# Patient Record
Sex: FEMALE | Race: WHITE | Employment: OTHER | URBAN - METROPOLITAN AREA
[De-identification: names, ages, dates, MRNs, and addresses within clinical notes are randomized per-mention and may not be internally consistent; named-entity substitution may affect disease eponyms.]

---

## 2017-04-07 ENCOUNTER — HOSPITAL ENCOUNTER (EMERGENCY)
Age: 62
Discharge: HOME | End: 2017-04-07
Attending: EMERGENCY MEDICINE
Payer: MEDICARE

## 2017-04-07 ENCOUNTER — APPOINTMENT (OUTPATIENT)
Dept: GENERAL RADIOLOGY | Age: 62
End: 2017-04-07
Payer: MEDICARE

## 2017-04-07 VITALS
WEIGHT: 135 LBS | HEIGHT: 61 IN | OXYGEN SATURATION: 99 % | HEART RATE: 71 BPM | TEMPERATURE: 97.3 F | RESPIRATION RATE: 18 BRPM | BODY MASS INDEX: 25.49 KG/M2 | DIASTOLIC BLOOD PRESSURE: 89 MMHG | SYSTOLIC BLOOD PRESSURE: 164 MMHG

## 2017-04-07 DIAGNOSIS — M17.10 ARTHRITIS OF KNEE: Primary | ICD-10-CM

## 2017-04-07 PROCEDURE — 73562 X-RAY EXAM OF KNEE 3: CPT

## 2017-04-07 PROCEDURE — 99283 EMERGENCY DEPT VISIT LOW MDM: CPT

## 2017-04-07 RX ORDER — QUETIAPINE FUMARATE 100 MG/1
100 TABLET, FILM COATED ORAL DAILY
COMMUNITY

## 2017-04-07 RX ORDER — IBUPROFEN 600 MG/1
600 TABLET ORAL EVERY 8 HOURS PRN
Qty: 30 TABLET | Refills: 0 | Status: SHIPPED | OUTPATIENT
Start: 2017-04-07

## 2017-04-07 RX ORDER — ASPIRIN 81 MG/1
81 TABLET, CHEWABLE ORAL DAILY
COMMUNITY

## 2017-04-07 ASSESSMENT — PAIN DESCRIPTION - DESCRIPTORS: DESCRIPTORS: SHOOTING

## 2017-04-07 ASSESSMENT — PAIN DESCRIPTION - LOCATION: LOCATION: KNEE

## 2017-04-07 ASSESSMENT — ENCOUNTER SYMPTOMS
GASTROINTESTINAL NEGATIVE: 1
RESPIRATORY NEGATIVE: 1
EYES NEGATIVE: 1
ALLERGIC/IMMUNOLOGIC NEGATIVE: 1

## 2017-04-07 ASSESSMENT — PAIN DESCRIPTION - ORIENTATION: ORIENTATION: LEFT

## 2017-04-07 ASSESSMENT — PAIN SCALES - GENERAL: PAINLEVEL_OUTOF10: 9

## 2017-12-19 ENCOUNTER — HOSPITAL ENCOUNTER (EMERGENCY)
Age: 62
Discharge: HOME | End: 2017-12-19
Attending: EMERGENCY MEDICINE
Payer: COMMERCIAL

## 2017-12-19 VITALS
SYSTOLIC BLOOD PRESSURE: 151 MMHG | OXYGEN SATURATION: 98 % | TEMPERATURE: 98.3 F | HEIGHT: 61 IN | HEART RATE: 79 BPM | BODY MASS INDEX: 25.49 KG/M2 | WEIGHT: 135 LBS | RESPIRATION RATE: 16 BRPM | DIASTOLIC BLOOD PRESSURE: 100 MMHG

## 2017-12-19 DIAGNOSIS — H66.90 ACUTE OTITIS MEDIA, UNSPECIFIED OTITIS MEDIA TYPE: ICD-10-CM

## 2017-12-19 DIAGNOSIS — H92.02 OTALGIA OF LEFT EAR: Primary | ICD-10-CM

## 2017-12-19 LAB
DIRECT EXAM: NORMAL
Lab: NORMAL
SPECIMEN DESCRIPTION: NORMAL
SPECIMEN DESCRIPTION: NORMAL
STATUS: NORMAL

## 2017-12-19 PROCEDURE — 87804 INFLUENZA ASSAY W/OPTIC: CPT

## 2017-12-19 PROCEDURE — 96374 THER/PROPH/DIAG INJ IV PUSH: CPT

## 2017-12-19 PROCEDURE — 99283 EMERGENCY DEPT VISIT LOW MDM: CPT

## 2017-12-19 PROCEDURE — 6360000002 HC RX W HCPCS: Performed by: EMERGENCY MEDICINE

## 2017-12-19 PROCEDURE — 6370000000 HC RX 637 (ALT 250 FOR IP): Performed by: EMERGENCY MEDICINE

## 2017-12-19 RX ORDER — AMOXICILLIN AND CLAVULANATE POTASSIUM 875; 125 MG/1; MG/1
1 TABLET, FILM COATED ORAL ONCE
Status: COMPLETED | OUTPATIENT
Start: 2017-12-19 | End: 2017-12-19

## 2017-12-19 RX ORDER — AMOXICILLIN AND CLAVULANATE POTASSIUM 875; 125 MG/1; MG/1
1 TABLET, FILM COATED ORAL 2 TIMES DAILY
Qty: 20 TABLET | Refills: 0 | Status: SHIPPED | OUTPATIENT
Start: 2017-12-19 | End: 2017-12-29

## 2017-12-19 RX ORDER — KETOROLAC TROMETHAMINE 30 MG/ML
30 INJECTION, SOLUTION INTRAMUSCULAR; INTRAVENOUS ONCE
Status: COMPLETED | OUTPATIENT
Start: 2017-12-19 | End: 2017-12-19

## 2017-12-19 RX ADMIN — AMOXICILLIN AND CLAVULANATE POTASSIUM 1 TABLET: 875; 125 TABLET, FILM COATED ORAL at 17:34

## 2017-12-19 RX ADMIN — KETOROLAC TROMETHAMINE 30 MG: 30 INJECTION, SOLUTION INTRAMUSCULAR at 17:34

## 2017-12-19 ASSESSMENT — ENCOUNTER SYMPTOMS
SORE THROAT: 0
EYES NEGATIVE: 1
VOMITING: 0
BACK PAIN: 0
SHORTNESS OF BREATH: 0
RESPIRATORY NEGATIVE: 1
ABDOMINAL PAIN: 0
GASTROINTESTINAL NEGATIVE: 1
COUGH: 0

## 2017-12-19 ASSESSMENT — PAIN SCALES - GENERAL
PAINLEVEL_OUTOF10: 6
PAINLEVEL_OUTOF10: 6

## 2017-12-19 NOTE — ED NOTES
Flu swab labeled and sent to lab. Pt tolerated procedure well.       Smith Francisco RN  12/19/17 3085

## 2017-12-19 NOTE — ED PROVIDER NOTES
[address] ED  eMERGENCY dEPARTMENT eNCOUnter    Pt Name: Melissa Vargas  MRN: [id number]  [place] 1955  Date of evaluation: 12/19/17  CHIEF COMPLAINT       Chief Complaint   Patient presents with    Influenza    Otalgia     HISTORY OF PRESENT ILLNESS   The pt presents with fever and ear pain. She presented via ems. Symptoms started last night. She has sick contacts at home with uri symptoms. No rashes. No neck pain. No cough or sore throat. No speech changes or difficulty swallowing. She is a smoker. The history is provided by the patient. Ear Problem   Location:  Left  Quality:  Sharp  Severity:  Moderate  Onset quality:  Gradual  Duration:  1 day  Timing:  Constant  Progression:  Worsening  Chronicity:  New  Relieved by:  Nothing  Worsened by:  Nothing  Ineffective treatments:  None tried  Associated symptoms: congestion and fever    Associated symptoms: no abdominal pain, no cough, no ear discharge, no headaches, no neck pain, no rash, no sore throat and no vomiting        REVIEW OF SYSTEMS     Review of Systems   Constitutional: Positive for fever. HENT: Positive for congestion. Negative for ear discharge and sore throat. Eyes: Negative. Respiratory: Negative. Negative for cough and shortness of breath. Cardiovascular: Negative. Negative for chest pain. Gastrointestinal: Negative. Negative for abdominal pain and vomiting. Genitourinary: Negative. Musculoskeletal: Negative. Negative for back pain and neck pain. Skin: Negative. Negative for rash. Neurological: Negative. Negative for headaches. All other systems reviewed and are negative.     PAST MEDICAL HISTORY     Past Medical History:   Diagnosis Date    Cancer [facility])     uterine    Carpal tunnel syndrome     bilat, numbness bilat fingers    Diabetes ([facility] [address]     borderline    Fall 8/2015    states tripped while trimming bush, bruising cheat and abdomen    Full dentures     History of ear infections also right ear \" hisses \"    Hx of blood clots     both legs    Hypertension     Pneumonia     x4    Poor historian     Sciatica      SURGICAL HISTORY       Past Surgical History:   Procedure Laterality Date    ANKLE SURGERY      Lt ankle    CHOLECYSTECTOMY      HYSTERECTOMY      total    KNEE SURGERY Left      cyst, torn ligaments    NASAL ENDOSCOPY  2015    with biopsy    NOSE SURGERY      cut bone off    OTHER SURGICAL HISTORY      jaw surgery     TONSILLECTOMY AND ADENOIDECTOMY      TUBAL LIGATION       CURRENT MEDICATIONS       Discharge Medication List as of 2017  5:31 PM      CONTINUE these medications which have NOT CHANGED    Details   QUEtiapine (SEROQUEL) 100 MG tablet Take 100 mg by mouth dailyHistorical Med      aspirin 81 MG chewable tablet Take 81 mg by mouth dailyHistorical Med      ibuprofen (ADVIL;MOTRIN) 600 MG tablet Take 1 tablet by mouth every 8 hours as needed for Pain, Disp-30 tablet, R-0Print      vitamin D (CHOLECALCIFEROL) 1000 UNIT TABS tablet Take 1,000 Units by mouth daily      cloNIDine (CATAPRES) 0.2 MG tablet take 1 tablet by mouth twice a day, Disp-60 tablet, R-3      gabapentin (NEURONTIN) 600 MG tablet take 1 tablet by mouth three times a day., Disp-90 tablet, R-3           ALLERGIES     is allergic to tape [adhesive tape] and tylenol [acetaminophen]. FAMILY HISTORY     indicated that her mother is alive. She indicated that her father is . SOCIAL HISTORY      reports that she has been smoking. She has a 15.00 pack-year smoking history. She has never used smokeless tobacco. She reports that she does not drink alcohol or use drugs. PHYSICAL EXAM     INITIAL VITALS: BP (!) 151/100   Pulse 79   Temp 98.3 °F (36.8 °C) (Oral)   Resp 16   Ht 5' 1\" (1.549 m)   Wt 135 lb (61.2 kg)   SpO2 98%   BMI 25.51 kg/m²    Physical Exam   Constitutional: She is oriented to person, place, and time. No distress.    HENT:   Head: Normocephalic and 79   Resp:  16   Temp:  98.3 °F (36.8 °C)   TempSrc:  Oral   SpO2: 98% 98%   Weight:  135 lb (61.2 kg)   Height:  5' 1\" (1.549 m)     The patient was given the following medications while in the emergency department:  Orders Placed This Encounter   Medications    ketorolac (TORADOL) injection 30 mg    amoxicillin-clavulanate (AUGMENTIN) 875-125 MG per tablet 1 tablet    amoxicillin-clavulanate (AUGMENTIN) 875-125 MG per tablet     Sig: Take 1 tablet by mouth 2 times daily for 10 days     Dispense:  20 tablet     Refill:  0     CONSULTS:  None    FINAL IMPRESSION      1. Otalgia of left ear    2.  Acute otitis media, unspecified otitis media type          DISPOSITION/PLAN   DISPOSITION Decision To Discharge 12/19/2017 05:30:20 PM      PATIENT REFERRED TO:  Clara Velarde [address] [place] [address] [address]  [phone]    Call in 1 day      DISCHARGE MEDICATIONS:  Discharge Medication List as of 12/19/2017  5:31 PM      START taking these medications    Details   amoxicillin-clavulanate (AUGMENTIN) 875-125 MG per tablet Take 1 tablet by mouth 2 times daily for 10 days, Disp-20 tablet, R-0Print           Ramu Barros MD  Attending Emergency Physician                      Elaine Maldonado MD  12/19/17 0648

## 2017-12-19 NOTE — ED NOTES
Bed: 04  Expected date:   Expected time:   Means of arrival:   Comments:     Omar Swain RN  12/19/17 7484

## 2018-05-31 ENCOUNTER — HOSPITAL ENCOUNTER (INPATIENT)
Age: 63
LOS: 1 days | Discharge: LEFT BEFORE DISCHARGE | DRG: 917 | End: 2018-06-02
Attending: EMERGENCY MEDICINE | Admitting: INTERNAL MEDICINE
Payer: COMMERCIAL

## 2018-05-31 DIAGNOSIS — F14.10 COCAINE ABUSE (HCC): ICD-10-CM

## 2018-05-31 DIAGNOSIS — R79.89 ELEVATED SERUM CREATININE: ICD-10-CM

## 2018-05-31 DIAGNOSIS — R09.02 HYPOXIA: ICD-10-CM

## 2018-05-31 DIAGNOSIS — N17.9 AKI (ACUTE KIDNEY INJURY) (HCC): ICD-10-CM

## 2018-05-31 DIAGNOSIS — I21.4 NSTEMI (NON-ST ELEVATED MYOCARDIAL INFARCTION) (HCC): Primary | ICD-10-CM

## 2018-05-31 DIAGNOSIS — T40.604A OPIATE OVERDOSE, UNDETERMINED INTENT, INITIAL ENCOUNTER (HCC): ICD-10-CM

## 2018-05-31 DIAGNOSIS — R79.9 ELEVATED BUN: ICD-10-CM

## 2018-05-31 LAB
AMPHETAMINE SCREEN URINE: NEGATIVE
BARBITURATE SCREEN URINE: NEGATIVE
BENZODIAZEPINE SCREEN, URINE: NEGATIVE
BUPRENORPHINE URINE: ABNORMAL
CANNABINOID SCREEN URINE: NEGATIVE
COCAINE METABOLITE, URINE: POSITIVE
MDMA URINE: ABNORMAL
METHADONE SCREEN, URINE: NEGATIVE
METHAMPHETAMINE, URINE: ABNORMAL
OPIATES, URINE: POSITIVE
OXYCODONE SCREEN URINE: NEGATIVE
PHENCYCLIDINE, URINE: NEGATIVE
PROPOXYPHENE, URINE: ABNORMAL
TEST INFORMATION: ABNORMAL
TRICYCLIC ANTIDEPRESSANTS, UR: ABNORMAL

## 2018-05-31 PROCEDURE — 6360000002 HC RX W HCPCS: Performed by: EMERGENCY MEDICINE

## 2018-05-31 PROCEDURE — 2580000003 HC RX 258: Performed by: EMERGENCY MEDICINE

## 2018-05-31 PROCEDURE — 99285 EMERGENCY DEPT VISIT HI MDM: CPT

## 2018-05-31 PROCEDURE — 80053 COMPREHEN METABOLIC PANEL: CPT

## 2018-05-31 PROCEDURE — 85025 COMPLETE CBC W/AUTO DIFF WBC: CPT

## 2018-05-31 PROCEDURE — 96374 THER/PROPH/DIAG INJ IV PUSH: CPT

## 2018-05-31 PROCEDURE — 84484 ASSAY OF TROPONIN QUANT: CPT

## 2018-05-31 PROCEDURE — 80307 DRUG TEST PRSMV CHEM ANLYZR: CPT

## 2018-05-31 PROCEDURE — 96376 TX/PRO/DX INJ SAME DRUG ADON: CPT

## 2018-05-31 PROCEDURE — 93005 ELECTROCARDIOGRAM TRACING: CPT

## 2018-05-31 PROCEDURE — 6360000002 HC RX W HCPCS

## 2018-05-31 RX ORDER — NALOXONE HYDROCHLORIDE 1 MG/ML
INJECTION INTRAMUSCULAR; INTRAVENOUS; SUBCUTANEOUS
Status: COMPLETED
Start: 2018-05-31 | End: 2018-05-31

## 2018-05-31 RX ORDER — NALOXONE HYDROCHLORIDE 1 MG/ML
2 INJECTION INTRAMUSCULAR; INTRAVENOUS; SUBCUTANEOUS ONCE
Status: COMPLETED | OUTPATIENT
Start: 2018-05-31 | End: 2018-05-31

## 2018-05-31 RX ADMIN — NALOXONE HYDROCHLORIDE 1 MG/HR: 1 INJECTION PARENTERAL at 22:28

## 2018-05-31 RX ADMIN — NALOXONE HYDROCHLORIDE 2 MG: 1 INJECTION PARENTERAL at 21:45

## 2018-05-31 RX ADMIN — NALOXONE HYDROCHLORIDE 2 MG: 1 INJECTION INTRAMUSCULAR; INTRAVENOUS; SUBCUTANEOUS at 21:45

## 2018-05-31 ASSESSMENT — PAIN SCALES - PAIN ASSESSMENT IN ADVANCED DEMENTIA (PAINAD)
BREATHING: 0
BODYLANGUAGE: 0
TOTALSCORE: 0
CONSOLABILITY: 0
NEGVOCALIZATION: 0
FACIALEXPRESSION: 0

## 2018-06-01 ENCOUNTER — APPOINTMENT (OUTPATIENT)
Dept: GENERAL RADIOLOGY | Age: 63
DRG: 917 | End: 2018-06-01
Payer: COMMERCIAL

## 2018-06-01 ENCOUNTER — APPOINTMENT (OUTPATIENT)
Dept: ULTRASOUND IMAGING | Age: 63
DRG: 917 | End: 2018-06-01
Payer: COMMERCIAL

## 2018-06-01 PROBLEM — T50.901A DRUG OVERDOSE, ACCIDENTAL OR UNINTENTIONAL, INITIAL ENCOUNTER: Status: ACTIVE | Noted: 2018-06-01

## 2018-06-01 PROBLEM — N17.9 AKI (ACUTE KIDNEY INJURY) (HCC): Status: ACTIVE | Noted: 2018-06-01

## 2018-06-01 LAB
-: ABNORMAL
ABSOLUTE BANDS #: 1.18 K/UL (ref 0–1)
ABSOLUTE EOS #: 0 K/UL (ref 0–0.4)
ABSOLUTE IMMATURE GRANULOCYTE: ABNORMAL K/UL (ref 0–0.3)
ABSOLUTE LYMPH #: 1.32 K/UL (ref 1–4.8)
ABSOLUTE MONO #: 1.03 K/UL (ref 0.1–1.3)
ALBUMIN SERPL-MCNC: 3.6 G/DL (ref 3.5–5.2)
ALBUMIN SERPL-MCNC: 3.6 G/DL (ref 3.5–5.2)
ALBUMIN/GLOBULIN RATIO: ABNORMAL (ref 1–2.5)
ALBUMIN/GLOBULIN RATIO: ABNORMAL (ref 1–2.5)
ALP BLD-CCNC: 30 U/L (ref 35–104)
ALP BLD-CCNC: 32 U/L (ref 35–104)
ALT SERPL-CCNC: 13 U/L (ref 5–33)
ALT SERPL-CCNC: 13 U/L (ref 5–33)
AMORPHOUS: ABNORMAL
ANION GAP SERPL CALCULATED.3IONS-SCNC: 13 MMOL/L (ref 9–17)
ANION GAP SERPL CALCULATED.3IONS-SCNC: 14 MMOL/L (ref 9–17)
AST SERPL-CCNC: 24 U/L
AST SERPL-CCNC: 26 U/L
BACTERIA: ABNORMAL
BANDS: 8 % (ref 0–10)
BASOPHILS # BLD: 0 % (ref 0–2)
BASOPHILS ABSOLUTE: 0 K/UL (ref 0–0.2)
BILIRUB SERPL-MCNC: 0.35 MG/DL (ref 0.3–1.2)
BILIRUB SERPL-MCNC: 0.47 MG/DL (ref 0.3–1.2)
BILIRUBIN URINE: NEGATIVE
BUN BLDV-MCNC: 22 MG/DL (ref 8–23)
BUN BLDV-MCNC: 24 MG/DL (ref 8–23)
BUN/CREAT BLD: ABNORMAL (ref 9–20)
BUN/CREAT BLD: ABNORMAL (ref 9–20)
CALCIUM IONIZED: 1.19 MMOL/L (ref 1.13–1.33)
CALCIUM SERPL-MCNC: 7.4 MG/DL (ref 8.6–10.4)
CALCIUM SERPL-MCNC: 8.2 MG/DL (ref 8.6–10.4)
CASTS UA: ABNORMAL /LPF
CHLORIDE BLD-SCNC: 105 MMOL/L (ref 98–107)
CHLORIDE BLD-SCNC: 105 MMOL/L (ref 98–107)
CO2: 20 MMOL/L (ref 20–31)
CO2: 21 MMOL/L (ref 20–31)
COLOR: YELLOW
COMMENT UA: ABNORMAL
CREAT SERPL-MCNC: 1.73 MG/DL (ref 0.5–0.9)
CREAT SERPL-MCNC: 2.4 MG/DL (ref 0.5–0.9)
CREATININE URINE: 99.2 MG/DL (ref 28–217)
CRYSTALS, UA: ABNORMAL /HPF
DIFFERENTIAL TYPE: ABNORMAL
DIRECT EXAM: NORMAL
EKG ATRIAL RATE: 87 BPM
EKG ATRIAL RATE: 88 BPM
EKG P AXIS: 69 DEGREES
EKG P AXIS: 70 DEGREES
EKG P-R INTERVAL: 158 MS
EKG P-R INTERVAL: 162 MS
EKG Q-T INTERVAL: 392 MS
EKG Q-T INTERVAL: 398 MS
EKG QRS DURATION: 76 MS
EKG QRS DURATION: 82 MS
EKG QTC CALCULATION (BAZETT): 471 MS
EKG QTC CALCULATION (BAZETT): 481 MS
EKG R AXIS: 49 DEGREES
EKG R AXIS: 52 DEGREES
EKG T AXIS: -46 DEGREES
EKG T AXIS: 12 DEGREES
EKG VENTRICULAR RATE: 87 BPM
EKG VENTRICULAR RATE: 88 BPM
EOSINOPHILS RELATIVE PERCENT: 0 % (ref 0–4)
EPITHELIAL CELLS UA: ABNORMAL /HPF
GFR AFRICAN AMERICAN: 25 ML/MIN
GFR AFRICAN AMERICAN: 36 ML/MIN
GFR NON-AFRICAN AMERICAN: 20 ML/MIN
GFR NON-AFRICAN AMERICAN: 30 ML/MIN
GFR SERPL CREATININE-BSD FRML MDRD: ABNORMAL ML/MIN/{1.73_M2}
GLUCOSE BLD-MCNC: 109 MG/DL (ref 70–99)
GLUCOSE BLD-MCNC: 121 MG/DL (ref 70–99)
GLUCOSE URINE: NEGATIVE
HCT VFR BLD CALC: 40.8 % (ref 36–46)
HCT VFR BLD CALC: 42.2 % (ref 36–46)
HEMOGLOBIN: 13.7 G/DL (ref 12–16)
HEMOGLOBIN: 14.1 G/DL (ref 12–16)
IMMATURE GRANULOCYTES: ABNORMAL %
INR BLD: 1.1
KETONES, URINE: NEGATIVE
LEUKOCYTE ESTERASE, URINE: NEGATIVE
LV EF: 75 %
LVEF MODALITY: NORMAL
LYMPHOCYTES # BLD: 9 % (ref 24–44)
Lab: NORMAL
Lab: NORMAL
MAGNESIUM: 1.7 MG/DL (ref 1.6–2.6)
MCH RBC QN AUTO: 27.6 PG (ref 26–34)
MCH RBC QN AUTO: 27.9 PG (ref 26–34)
MCHC RBC AUTO-ENTMCNC: 33.3 G/DL (ref 31–37)
MCHC RBC AUTO-ENTMCNC: 33.5 G/DL (ref 31–37)
MCV RBC AUTO: 82.8 FL (ref 80–100)
MCV RBC AUTO: 83.1 FL (ref 80–100)
MONOCYTES # BLD: 7 % (ref 1–7)
MORPHOLOGY: ABNORMAL
MUCUS: ABNORMAL
NITRITE, URINE: NEGATIVE
NRBC AUTOMATED: ABNORMAL PER 100 WBC
NRBC AUTOMATED: ABNORMAL PER 100 WBC
OTHER OBSERVATIONS UA: ABNORMAL
PARTIAL THROMBOPLASTIN TIME: 23.6 SEC (ref 23–31)
PDW BLD-RTO: 15.6 % (ref 11.5–14.9)
PDW BLD-RTO: 15.7 % (ref 11.5–14.9)
PH UA: 5.5 (ref 5–8)
PLATELET # BLD: 129 K/UL (ref 150–450)
PLATELET # BLD: 145 K/UL (ref 150–450)
PLATELET ESTIMATE: ABNORMAL
PMV BLD AUTO: 7.9 FL (ref 6–12)
PMV BLD AUTO: 8 FL (ref 6–12)
POTASSIUM SERPL-SCNC: 4 MMOL/L (ref 3.7–5.3)
POTASSIUM SERPL-SCNC: 4 MMOL/L (ref 3.7–5.3)
PROTEIN UA: ABNORMAL
PROTHROMBIN TIME: 11.8 SEC (ref 9.7–12)
RBC # BLD: 4.91 M/UL (ref 4–5.2)
RBC # BLD: 5.1 M/UL (ref 4–5.2)
RBC # BLD: ABNORMAL 10*6/UL
RBC UA: ABNORMAL /HPF
RENAL EPITHELIAL, UA: ABNORMAL /HPF
SEG NEUTROPHILS: 76 % (ref 36–66)
SEGMENTED NEUTROPHILS ABSOLUTE COUNT: 11.17 K/UL (ref 1.3–9.1)
SODIUM BLD-SCNC: 138 MMOL/L (ref 135–144)
SODIUM BLD-SCNC: 140 MMOL/L (ref 135–144)
SODIUM,UR: 68 MMOL/L
SPECIFIC GRAVITY UA: 1.02 (ref 1–1.03)
SPECIMEN DESCRIPTION: NORMAL
STATUS: NORMAL
STATUS: NORMAL
TOTAL CK: 100 U/L (ref 26–192)
TOTAL PROTEIN: 7.3 G/DL (ref 6.4–8.3)
TOTAL PROTEIN: 7.4 G/DL (ref 6.4–8.3)
TRICHOMONAS: ABNORMAL
TROPONIN INTERP: ABNORMAL
TROPONIN T: 0.1 NG/ML
TROPONIN T: 0.11 NG/ML
TROPONIN T: 0.11 NG/ML
TROPONIN T: 0.13 NG/ML
TURBIDITY: ABNORMAL
URINE HGB: NEGATIVE
UROBILINOGEN, URINE: NORMAL
WBC # BLD: 14.6 K/UL (ref 3.5–11)
WBC # BLD: 14.7 K/UL (ref 3.5–11)
WBC # BLD: ABNORMAL 10*3/UL
WBC UA: ABNORMAL /HPF
YEAST: ABNORMAL

## 2018-06-01 PROCEDURE — 6370000000 HC RX 637 (ALT 250 FOR IP): Performed by: INTERNAL MEDICINE

## 2018-06-01 PROCEDURE — 2580000003 HC RX 258: Performed by: INTERNAL MEDICINE

## 2018-06-01 PROCEDURE — 87186 SC STD MICRODIL/AGAR DIL: CPT

## 2018-06-01 PROCEDURE — 76770 US EXAM ABDO BACK WALL COMP: CPT

## 2018-06-01 PROCEDURE — 82570 ASSAY OF URINE CREATININE: CPT

## 2018-06-01 PROCEDURE — 87899 AGENT NOS ASSAY W/OPTIC: CPT

## 2018-06-01 PROCEDURE — 85027 COMPLETE CBC AUTOMATED: CPT

## 2018-06-01 PROCEDURE — 6360000002 HC RX W HCPCS: Performed by: NURSE PRACTITIONER

## 2018-06-01 PROCEDURE — 82550 ASSAY OF CK (CPK): CPT

## 2018-06-01 PROCEDURE — 2060000000 HC ICU INTERMEDIATE R&B

## 2018-06-01 PROCEDURE — 6370000000 HC RX 637 (ALT 250 FOR IP): Performed by: STUDENT IN AN ORGANIZED HEALTH CARE EDUCATION/TRAINING PROGRAM

## 2018-06-01 PROCEDURE — 85730 THROMBOPLASTIN TIME PARTIAL: CPT

## 2018-06-01 PROCEDURE — 2580000003 HC RX 258: Performed by: NURSE PRACTITIONER

## 2018-06-01 PROCEDURE — 80053 COMPREHEN METABOLIC PANEL: CPT

## 2018-06-01 PROCEDURE — 51702 INSERT TEMP BLADDER CATH: CPT

## 2018-06-01 PROCEDURE — 51798 US URINE CAPACITY MEASURE: CPT

## 2018-06-01 PROCEDURE — 93005 ELECTROCARDIOGRAM TRACING: CPT

## 2018-06-01 PROCEDURE — 84300 ASSAY OF URINE SODIUM: CPT

## 2018-06-01 PROCEDURE — 71045 X-RAY EXAM CHEST 1 VIEW: CPT

## 2018-06-01 PROCEDURE — 82330 ASSAY OF CALCIUM: CPT

## 2018-06-01 PROCEDURE — 99223 1ST HOSP IP/OBS HIGH 75: CPT | Performed by: INTERNAL MEDICINE

## 2018-06-01 PROCEDURE — 81001 URINALYSIS AUTO W/SCOPE: CPT

## 2018-06-01 PROCEDURE — 83735 ASSAY OF MAGNESIUM: CPT

## 2018-06-01 PROCEDURE — 36415 COLL VENOUS BLD VENIPUNCTURE: CPT

## 2018-06-01 PROCEDURE — 93306 TTE W/DOPPLER COMPLETE: CPT

## 2018-06-01 PROCEDURE — 6370000000 HC RX 637 (ALT 250 FOR IP): Performed by: EMERGENCY MEDICINE

## 2018-06-01 PROCEDURE — 94762 N-INVAS EAR/PLS OXIMTRY CONT: CPT

## 2018-06-01 PROCEDURE — 84484 ASSAY OF TROPONIN QUANT: CPT

## 2018-06-01 PROCEDURE — 6360000002 HC RX W HCPCS: Performed by: INTERNAL MEDICINE

## 2018-06-01 PROCEDURE — 85610 PROTHROMBIN TIME: CPT

## 2018-06-01 PROCEDURE — 87086 URINE CULTURE/COLONY COUNT: CPT

## 2018-06-01 PROCEDURE — 87449 NOS EACH ORGANISM AG IA: CPT

## 2018-06-01 PROCEDURE — 87077 CULTURE AEROBIC IDENTIFY: CPT

## 2018-06-01 PROCEDURE — 86738 MYCOPLASMA ANTIBODY: CPT

## 2018-06-01 RX ORDER — QUETIAPINE FUMARATE 100 MG/1
100 TABLET, FILM COATED ORAL DAILY
Status: DISCONTINUED | OUTPATIENT
Start: 2018-06-01 | End: 2018-06-02 | Stop reason: HOSPADM

## 2018-06-01 RX ORDER — SODIUM CHLORIDE 9 MG/ML
INJECTION, SOLUTION INTRAVENOUS CONTINUOUS
Status: DISCONTINUED | OUTPATIENT
Start: 2018-06-01 | End: 2018-06-02 | Stop reason: HOSPADM

## 2018-06-01 RX ORDER — MECLIZINE HYDROCHLORIDE 25 MG/1
25 TABLET ORAL 2 TIMES DAILY PRN
COMMUNITY

## 2018-06-01 RX ORDER — CETIRIZINE HYDROCHLORIDE 10 MG/1
10 TABLET ORAL DAILY
COMMUNITY

## 2018-06-01 RX ORDER — LISINOPRIL 20 MG/1
20 TABLET ORAL DAILY
COMMUNITY

## 2018-06-01 RX ORDER — NAPROXEN 500 MG/1
500 TABLET ORAL 2 TIMES DAILY WITH MEALS
COMMUNITY

## 2018-06-01 RX ORDER — SODIUM CHLORIDE 9 MG/ML
INJECTION, SOLUTION INTRAVENOUS CONTINUOUS
Status: DISCONTINUED | OUTPATIENT
Start: 2018-06-01 | End: 2018-06-01

## 2018-06-01 RX ORDER — M-VIT,TX,IRON,MINS/CALC/FOLIC 27MG-0.4MG
1 TABLET ORAL DAILY
COMMUNITY

## 2018-06-01 RX ORDER — NICOTINE 21 MG/24HR
1 PATCH, TRANSDERMAL 24 HOURS TRANSDERMAL DAILY PRN
Status: DISCONTINUED | OUTPATIENT
Start: 2018-06-01 | End: 2018-06-02 | Stop reason: HOSPADM

## 2018-06-01 RX ORDER — AMLODIPINE BESYLATE 5 MG/1
5 TABLET ORAL DAILY
Status: DISCONTINUED | OUTPATIENT
Start: 2018-06-02 | End: 2018-06-02 | Stop reason: HOSPADM

## 2018-06-01 RX ORDER — ONDANSETRON 2 MG/ML
4 INJECTION INTRAMUSCULAR; INTRAVENOUS EVERY 6 HOURS PRN
Status: DISCONTINUED | OUTPATIENT
Start: 2018-06-01 | End: 2018-06-02 | Stop reason: HOSPADM

## 2018-06-01 RX ORDER — ASPIRIN 300 MG/1
300 SUPPOSITORY RECTAL EVERY 6 HOURS PRN
Status: DISCONTINUED | OUTPATIENT
Start: 2018-06-01 | End: 2018-06-02 | Stop reason: HOSPADM

## 2018-06-01 RX ORDER — SODIUM CHLORIDE 0.9 % (FLUSH) 0.9 %
10 SYRINGE (ML) INJECTION PRN
Status: DISCONTINUED | OUTPATIENT
Start: 2018-06-01 | End: 2018-06-02 | Stop reason: HOSPADM

## 2018-06-01 RX ORDER — GABAPENTIN 600 MG/1
600 TABLET ORAL 2 TIMES DAILY
Status: DISCONTINUED | OUTPATIENT
Start: 2018-06-01 | End: 2018-06-02 | Stop reason: HOSPADM

## 2018-06-01 RX ORDER — ASPIRIN 81 MG/1
81 TABLET ORAL DAILY
Status: DISCONTINUED | OUTPATIENT
Start: 2018-06-01 | End: 2018-06-02 | Stop reason: HOSPADM

## 2018-06-01 RX ORDER — CLONIDINE HYDROCHLORIDE 0.2 MG/1
0.2 TABLET ORAL 2 TIMES DAILY
Status: DISCONTINUED | OUTPATIENT
Start: 2018-06-01 | End: 2018-06-02 | Stop reason: HOSPADM

## 2018-06-01 RX ORDER — SODIUM CHLORIDE 0.9 % (FLUSH) 0.9 %
10 SYRINGE (ML) INJECTION EVERY 12 HOURS SCHEDULED
Status: DISCONTINUED | OUTPATIENT
Start: 2018-06-01 | End: 2018-06-02 | Stop reason: HOSPADM

## 2018-06-01 RX ADMIN — NALOXONE HYDROCHLORIDE 1 MG/HR: 1 INJECTION PARENTERAL at 03:45

## 2018-06-01 RX ADMIN — PIPERACILLIN SODIUM AND TAZOBACTAM SODIUM 3.38 G: 3; .375 INJECTION, POWDER, LYOPHILIZED, FOR SOLUTION INTRAVENOUS at 12:46

## 2018-06-01 RX ADMIN — NALOXONE HYDROCHLORIDE 1 MG/HR: 1 INJECTION PARENTERAL at 05:58

## 2018-06-01 RX ADMIN — ASPIRIN 300 MG: 300 SUPPOSITORY RECTAL at 00:54

## 2018-06-01 RX ADMIN — CLONIDINE HYDROCHLORIDE 0.2 MG: 0.2 TABLET ORAL at 12:46

## 2018-06-01 RX ADMIN — CLONIDINE HYDROCHLORIDE 0.2 MG: 0.2 TABLET ORAL at 20:14

## 2018-06-01 RX ADMIN — QUETIAPINE FUMARATE 100 MG: 100 TABLET ORAL at 20:14

## 2018-06-01 RX ADMIN — NALOXONE HYDROCHLORIDE 1 MG/HR: 1 INJECTION PARENTERAL at 01:20

## 2018-06-01 RX ADMIN — ASPIRIN 81 MG: 81 TABLET, COATED ORAL at 17:24

## 2018-06-01 RX ADMIN — NALOXONE HYDROCHLORIDE 1 MG/HR: 1 INJECTION PARENTERAL at 08:15

## 2018-06-01 RX ADMIN — PIPERACILLIN SODIUM AND TAZOBACTAM SODIUM 3.38 G: 3; .375 INJECTION, POWDER, LYOPHILIZED, FOR SOLUTION INTRAVENOUS at 06:48

## 2018-06-01 RX ADMIN — PIPERACILLIN SODIUM AND TAZOBACTAM SODIUM 3.38 G: 3; .375 INJECTION, POWDER, LYOPHILIZED, FOR SOLUTION INTRAVENOUS at 18:19

## 2018-06-01 RX ADMIN — SODIUM CHLORIDE: 9 INJECTION, SOLUTION INTRAVENOUS at 12:47

## 2018-06-01 RX ADMIN — GABAPENTIN 600 MG: 600 TABLET, FILM COATED ORAL at 20:14

## 2018-06-01 ASSESSMENT — PAIN SCALES - GENERAL
PAINLEVEL_OUTOF10: 0

## 2018-06-02 VITALS
DIASTOLIC BLOOD PRESSURE: 52 MMHG | OXYGEN SATURATION: 98 % | WEIGHT: 119.05 LBS | SYSTOLIC BLOOD PRESSURE: 112 MMHG | BODY MASS INDEX: 19.13 KG/M2 | HEIGHT: 66 IN | RESPIRATION RATE: 18 BRPM | HEART RATE: 64 BPM | TEMPERATURE: 97.3 F

## 2018-06-02 LAB
ALBUMIN SERPL-MCNC: 2.7 G/DL (ref 3.5–5.2)
ALBUMIN/GLOBULIN RATIO: ABNORMAL (ref 1–2.5)
ALP BLD-CCNC: 26 U/L (ref 35–104)
ALT SERPL-CCNC: 9 U/L (ref 5–33)
ANION GAP SERPL CALCULATED.3IONS-SCNC: 9 MMOL/L (ref 9–17)
AST SERPL-CCNC: 13 U/L
BILIRUB SERPL-MCNC: 0.27 MG/DL (ref 0.3–1.2)
BUN BLDV-MCNC: 14 MG/DL (ref 8–23)
BUN/CREAT BLD: ABNORMAL (ref 9–20)
CALCIUM IONIZED: 1.27 MMOL/L (ref 1.13–1.33)
CALCIUM SERPL-MCNC: 8.4 MG/DL (ref 8.6–10.4)
CHLORIDE BLD-SCNC: 108 MMOL/L (ref 98–107)
CO2: 23 MMOL/L (ref 20–31)
CREAT SERPL-MCNC: 0.82 MG/DL (ref 0.5–0.9)
CULTURE: ABNORMAL
CULTURE: ABNORMAL
GFR AFRICAN AMERICAN: >60 ML/MIN
GFR NON-AFRICAN AMERICAN: >60 ML/MIN
GFR SERPL CREATININE-BSD FRML MDRD: ABNORMAL ML/MIN/{1.73_M2}
GFR SERPL CREATININE-BSD FRML MDRD: ABNORMAL ML/MIN/{1.73_M2}
GLUCOSE BLD-MCNC: 103 MG/DL (ref 70–99)
HCT VFR BLD CALC: 37 % (ref 36–46)
HEMOGLOBIN: 12.5 G/DL (ref 12–16)
Lab: ABNORMAL
MCH RBC QN AUTO: 28 PG (ref 26–34)
MCHC RBC AUTO-ENTMCNC: 33.7 G/DL (ref 31–37)
MCV RBC AUTO: 83.3 FL (ref 80–100)
NRBC AUTOMATED: ABNORMAL PER 100 WBC
ORGANISM: ABNORMAL
PDW BLD-RTO: 15.6 % (ref 11.5–14.9)
PLATELET # BLD: 133 K/UL (ref 150–450)
PMV BLD AUTO: 8.1 FL (ref 6–12)
POTASSIUM SERPL-SCNC: 4.5 MMOL/L (ref 3.7–5.3)
RBC # BLD: 4.45 M/UL (ref 4–5.2)
SODIUM BLD-SCNC: 140 MMOL/L (ref 135–144)
SPECIMEN DESCRIPTION: ABNORMAL
SPECIMEN DESCRIPTION: ABNORMAL
STATUS: ABNORMAL
TOTAL PROTEIN: 6.4 G/DL (ref 6.4–8.3)
WBC # BLD: 9.6 K/UL (ref 3.5–11)

## 2018-06-02 PROCEDURE — 80053 COMPREHEN METABOLIC PANEL: CPT

## 2018-06-02 PROCEDURE — 6370000000 HC RX 637 (ALT 250 FOR IP): Performed by: INTERNAL MEDICINE

## 2018-06-02 PROCEDURE — 85027 COMPLETE CBC AUTOMATED: CPT

## 2018-06-02 PROCEDURE — 2580000003 HC RX 258: Performed by: NURSE PRACTITIONER

## 2018-06-02 PROCEDURE — 36415 COLL VENOUS BLD VENIPUNCTURE: CPT

## 2018-06-02 PROCEDURE — 99239 HOSP IP/OBS DSCHRG MGMT >30: CPT | Performed by: INTERNAL MEDICINE

## 2018-06-02 PROCEDURE — 6370000000 HC RX 637 (ALT 250 FOR IP): Performed by: STUDENT IN AN ORGANIZED HEALTH CARE EDUCATION/TRAINING PROGRAM

## 2018-06-02 PROCEDURE — 82330 ASSAY OF CALCIUM: CPT

## 2018-06-02 RX ORDER — QUETIAPINE FUMARATE 100 MG/1
100 TABLET, FILM COATED ORAL DAILY
Status: CANCELLED | OUTPATIENT
Start: 2018-06-02

## 2018-06-02 RX ORDER — AMLODIPINE BESYLATE 5 MG/1
5 TABLET ORAL DAILY
Status: CANCELLED | OUTPATIENT
Start: 2018-06-03

## 2018-06-02 RX ORDER — AMOXICILLIN AND CLAVULANATE POTASSIUM 875; 125 MG/1; MG/1
1 TABLET, FILM COATED ORAL EVERY 12 HOURS SCHEDULED
Status: CANCELLED | OUTPATIENT
Start: 2018-06-02 | End: 2018-06-08

## 2018-06-02 RX ORDER — CLONIDINE HYDROCHLORIDE 0.2 MG/1
0.2 TABLET ORAL 2 TIMES DAILY
Status: CANCELLED | OUTPATIENT
Start: 2018-06-02

## 2018-06-02 RX ORDER — AMOXICILLIN AND CLAVULANATE POTASSIUM 875; 125 MG/1; MG/1
1 TABLET, FILM COATED ORAL EVERY 12 HOURS SCHEDULED
Status: DISCONTINUED | OUTPATIENT
Start: 2018-06-02 | End: 2018-06-02 | Stop reason: HOSPADM

## 2018-06-02 RX ORDER — ASPIRIN 81 MG/1
81 TABLET ORAL DAILY
Status: CANCELLED | OUTPATIENT
Start: 2018-06-03

## 2018-06-02 RX ORDER — GABAPENTIN 600 MG/1
600 TABLET ORAL 2 TIMES DAILY
Status: CANCELLED | OUTPATIENT
Start: 2018-06-02

## 2018-06-02 RX ADMIN — AMLODIPINE BESYLATE 5 MG: 5 TABLET ORAL at 09:07

## 2018-06-02 RX ADMIN — GABAPENTIN 600 MG: 600 TABLET, FILM COATED ORAL at 09:06

## 2018-06-02 RX ADMIN — Medication 10 ML: at 09:07

## 2018-06-02 RX ADMIN — CLONIDINE HYDROCHLORIDE 0.2 MG: 0.2 TABLET ORAL at 09:06

## 2018-06-02 RX ADMIN — Medication 10 ML: at 09:17

## 2018-06-02 RX ADMIN — ASPIRIN 81 MG: 81 TABLET, COATED ORAL at 09:06

## 2018-06-02 ASSESSMENT — ENCOUNTER SYMPTOMS
BACK PAIN: 0
BLURRED VISION: 0
NAUSEA: 0
DOUBLE VISION: 0
COUGH: 0
VOMITING: 0
SHORTNESS OF BREATH: 0
CONSTIPATION: 0
SORE THROAT: 0
DIARRHEA: 0
ABDOMINAL PAIN: 0

## 2018-06-02 ASSESSMENT — PAIN SCALES - GENERAL
PAINLEVEL_OUTOF10: 0

## 2018-06-03 ENCOUNTER — CARE COORDINATION (OUTPATIENT)
Dept: CASE MANAGEMENT | Age: 63
End: 2018-06-03

## 2018-06-04 LAB — MYCOPLASMA PNEUMONIAE IGM: 0.76

## 2019-02-22 ENCOUNTER — HOSPITAL ENCOUNTER (EMERGENCY)
Age: 64
Discharge: LEFT BEFORE DISCHARGE | End: 2019-02-22
Attending: EMERGENCY MEDICINE
Payer: COMMERCIAL

## 2019-02-22 VITALS
SYSTOLIC BLOOD PRESSURE: 115 MMHG | RESPIRATION RATE: 14 BRPM | DIASTOLIC BLOOD PRESSURE: 89 MMHG | OXYGEN SATURATION: 96 % | HEART RATE: 134 BPM | TEMPERATURE: 98.8 F

## 2019-02-22 DIAGNOSIS — T40.601A OPIATE OVERDOSE, ACCIDENTAL OR UNINTENTIONAL, INITIAL ENCOUNTER (HCC): Primary | ICD-10-CM

## 2019-02-22 PROCEDURE — 96375 TX/PRO/DX INJ NEW DRUG ADDON: CPT

## 2019-02-22 PROCEDURE — 99284 EMERGENCY DEPT VISIT MOD MDM: CPT

## 2019-02-22 PROCEDURE — 6360000002 HC RX W HCPCS

## 2019-02-22 PROCEDURE — 6360000002 HC RX W HCPCS: Performed by: STUDENT IN AN ORGANIZED HEALTH CARE EDUCATION/TRAINING PROGRAM

## 2019-02-22 PROCEDURE — 96374 THER/PROPH/DIAG INJ IV PUSH: CPT

## 2019-02-22 RX ORDER — NALOXONE HYDROCHLORIDE 1 MG/ML
INJECTION INTRAMUSCULAR; INTRAVENOUS; SUBCUTANEOUS
Status: DISCONTINUED
Start: 2019-02-22 | End: 2019-02-22 | Stop reason: HOSPADM

## 2019-02-22 RX ORDER — ONDANSETRON 2 MG/ML
4 INJECTION INTRAMUSCULAR; INTRAVENOUS ONCE
Status: COMPLETED | OUTPATIENT
Start: 2019-02-22 | End: 2019-02-22

## 2019-02-22 RX ORDER — NALOXONE HYDROCHLORIDE 0.4 MG/ML
0.4 INJECTION, SOLUTION INTRAMUSCULAR; INTRAVENOUS; SUBCUTANEOUS ONCE
Status: COMPLETED | OUTPATIENT
Start: 2019-02-22 | End: 2019-02-22

## 2019-02-22 RX ORDER — ONDANSETRON 2 MG/ML
INJECTION INTRAMUSCULAR; INTRAVENOUS
Status: COMPLETED
Start: 2019-02-22 | End: 2019-02-22

## 2019-02-22 RX ADMIN — ONDANSETRON 4 MG: 2 INJECTION INTRAMUSCULAR; INTRAVENOUS at 15:07

## 2019-02-22 RX ADMIN — NALOXONE HYDROCHLORIDE 0.4 MG: 0.4 INJECTION, SOLUTION INTRAMUSCULAR; INTRAVENOUS; SUBCUTANEOUS at 15:07

## 2019-02-22 RX ADMIN — ONDANSETRON HYDROCHLORIDE 4 MG: 2 INJECTION, SOLUTION INTRAMUSCULAR; INTRAVENOUS at 15:07

## 2019-02-22 ASSESSMENT — ENCOUNTER SYMPTOMS
SHORTNESS OF BREATH: 1
EYE DISCHARGE: 0
PHOTOPHOBIA: 0
SORE THROAT: 0
RHINORRHEA: 0
ABDOMINAL DISTENTION: 0
VOMITING: 0
CHEST TIGHTNESS: 0
NAUSEA: 0
EYE PAIN: 0
COUGH: 0
BACK PAIN: 0
WHEEZING: 0

## 2020-11-03 PROBLEM — I10 HYPERTENSION: Status: RESOLVED | Noted: 2020-11-03 | Resolved: 2020-11-03
